# Patient Record
Sex: FEMALE
[De-identification: names, ages, dates, MRNs, and addresses within clinical notes are randomized per-mention and may not be internally consistent; named-entity substitution may affect disease eponyms.]

---

## 2020-08-02 ENCOUNTER — HOSPITAL ENCOUNTER (EMERGENCY)
Dept: HOSPITAL 97 - ER | Age: 50
LOS: 1 days | Discharge: HOME | End: 2020-08-03
Payer: SELF-PAY

## 2020-08-02 DIAGNOSIS — K29.70: Primary | ICD-10-CM

## 2020-08-02 LAB
ALBUMIN SERPL BCP-MCNC: 3.9 G/DL (ref 3.4–5)
ALP SERPL-CCNC: 71 U/L (ref 45–117)
ALT SERPL W P-5'-P-CCNC: 123 U/L (ref 12–78)
AST SERPL W P-5'-P-CCNC: 220 U/L (ref 15–37)
BUN BLD-MCNC: 16 MG/DL (ref 7–18)
GLUCOSE SERPLBLD-MCNC: 129 MG/DL (ref 74–106)
HCT VFR BLD CALC: 39.3 % (ref 36–45)
LIPASE SERPL-CCNC: 137 U/L (ref 73–393)
LYMPHOCYTES # SPEC AUTO: 0.9 K/UL (ref 0.7–4.9)
MORPHOLOGY BLD-IMP: (no result)
PMV BLD: 8.6 FL (ref 7.6–11.3)
POTASSIUM SERPL-SCNC: 4 MMOL/L (ref 3.5–5.1)
RBC # BLD: 4.26 M/UL (ref 3.86–4.86)

## 2020-08-02 PROCEDURE — 83690 ASSAY OF LIPASE: CPT

## 2020-08-02 PROCEDURE — 80048 BASIC METABOLIC PNL TOTAL CA: CPT

## 2020-08-02 PROCEDURE — 99284 EMERGENCY DEPT VISIT MOD MDM: CPT

## 2020-08-02 PROCEDURE — 81003 URINALYSIS AUTO W/O SCOPE: CPT

## 2020-08-02 PROCEDURE — 85025 COMPLETE CBC W/AUTO DIFF WBC: CPT

## 2020-08-02 PROCEDURE — 80076 HEPATIC FUNCTION PANEL: CPT

## 2020-08-02 PROCEDURE — 36415 COLL VENOUS BLD VENIPUNCTURE: CPT

## 2020-08-03 VITALS — TEMPERATURE: 98 F | OXYGEN SATURATION: 99 % | SYSTOLIC BLOOD PRESSURE: 116 MMHG | DIASTOLIC BLOOD PRESSURE: 74 MMHG

## 2020-08-03 NOTE — EDPHYS
Physician Documentation                                                                           

 [facility]                                                                 

Name: Isabelle Walden                                                                    

Age: 50 yrs                                                                                       

Sex: Female                                                                                       

: 1970                                                                                   

MRN: [id number]                                                                                   

Arrival Date: 2020                                                                          

Time: 20:14                                                                                       

Account#: [id number]                                                                            

Bed 8                                                                                             

Private MD:                                                                                       

ED Physician Pedro Landa                                                                     

HPI:                                                                                              

                                                                                             

00:17 This 50 yrs old  Female presents to ER via Ambulatory with complaints of        tw4 

      Abdominal Pain.                                                                             

00:17 The patient presents with abdominal pain in the epigastric area. Onset: The             tw4 

      symptoms/episode began/occurred today. The symptoms do not radiate. Associated signs        

      and symptoms: none. The symptoms are described as sharp. Modifying factors: The             

      symptoms are alleviated by nothing, the symptoms are aggravated by nothing. Severity of     

      pain: At its worst the pain was mild in the emergency department the pain is unchanged.     

      The patient has not experienced similar symptoms in the past.                               

                                                                                                  

OB/GYN:                                                                                           

                                                                                             

22:30 LMP N/A - Unknown                                                                       wh  

                                                                                                  

Historical:                                                                                       

- Allergies:                                                                                      

20:22 No Known Allergies;                                                                     ll1 

- PMHx:                                                                                           

20:22 None;                                                                                   ll1 

- PSHx:                                                                                           

20:22 ;                                                                              ll1 

                                                                                                  

- Immunization history:: Flu vaccine is not up to date.                                           

- Social history:: Smoking status: Patient denies any tobacco usage or history of.                

  Patient/guardian denies using alcohol, street drugs, tobacco products.                          

                                                                                                  

                                                                                                  

ROS:                                                                                              

                                                                                             

00:17 Constitutional: Negative for fever, chills, and weight loss, Eyes: Negative for injury, tw4 

      pain, redness, and discharge, Cardiovascular: Negative for chest pain, palpitations,        

      and edema, Respiratory: Negative for shortness of breath, cough, wheezing, and              

      pleuritic chest pain, Back: Negative for injury and pain, MS/Extremity: Negative for        

      injury and deformity, Skin: Negative for injury, rash, and discoloration, Neuro:            

      Negative for headache, weakness, numbness, tingling, and seizure.                           

      Abdomen/GI: Positive for abdominal pain, Negative for nausea and vomiting, nausea,          

      vomiting, and diarrhea, nausea, diarrhea, constipation, abdominal cramps, abdominal         

      distension, anorexia.                                                                       

                                                                                                  

Exam:                                                                                             

00:17 Constitutional:  This is a well developed, well nourished patient who is awake, alert,  tw4 

      and in no acute distress. Head/Face:  Normocephalic, atraumatic. Chest/axilla:  Normal      

      chest wall appearance and motion.  Nontender with no deformity.  No lesions are             

      appreciated. Cardiovascular:  Regular rate and rhythm with a normal S1 and S2.  No          

      gallops, murmurs, or rubs.  Normal PMI, no JVD.  No pulse deficits. Respiratory:  Lungs     

      have equal breath sounds bilaterally, clear to auscultation and percussion.  No rales,      

      rhonchi or wheezes noted.  No increased work of breathing, no retractions or nasal          

      flaring. Skin:  Warm, dry with normal turgor.  Normal color with no rashes, no lesions,     

      and no evidence of cellulitis. MS/ Extremity:  Pulses equal, no cyanosis.                   

      Neurovascular intact.  Full, normal range of motion. Neuro:  Awake and alert, GCS 15,       

      oriented to person, place, time, and situation.  Cranial nerves II-XII grossly intact.      

      Motor strength 5/5 in all extremities.  Sensory grossly intact.  Cerebellar exam            

      normal.  Normal gait.                                                                       

00:17 Back:  No spinal tenderness.  No costovertebral tenderness.  Full range of motion.          

00:17 Abdomen/GI: Inspection: abdomen appears normal, Bowel sounds: diminished, Palpation:        

      mild abdominal tenderness, in the epigastric area.                                          

                                                                                                  

Vital Signs:                                                                                      

                                                                                             

20:20  / 74; Pulse 67; Resp 17; Temp 97.9; Pulse Ox 98% ; Weight 81.65 kg; Pain 10/10;  ll1 

22:00  / 65; Pulse 92; Resp 17; Pulse Ox 98% on R/A;                                    rv  

23:00  / 51; Pulse 81; Resp 16; Pulse Ox 96% on R/A;                                    rv  

                                                                                             

00:00  / 74; Pulse 84; Resp 18; Temp 98; Pulse Ox 99% on R/A;                           rv  

                                                                                                  

MDM:                                                                                              

                                                                                             

22:14 Patient medically screened.                                                             tw4 

                                                                                             

00:17 Data reviewed: vital signs, nurses notes. Data interpreted: Pulse oximetry:             tw4 

      Interpretation: normal. Counseling: I had a detailed discussion with the patient and/or     

      guardian regarding: the historical points, exam findings, and any diagnostic results        

      supporting the discharge/admit diagnosis. Special discussion: I discussed with the          

      patient/guardian in detail that at this point there is no indication for admission to       

      the hospital. It is understood, however, that if the symptoms persist or worsen the         

      patient needs to return immediately for re-evaluation.                                      

                                                                                                  

                                                                                             

22:16 Order name: Basic Metabolic Panel; Complete Time: 23:31                                 tw4 

                                                                                             

23:31 Interpretation: Normal except: GLUC 129; GFR 60.                                        tw4 

                                                                                             

22:16 Order name: CBC with Diff                                                               tw4 

                                                                                             

23:31 Interpretation: Normal except: RICHARD% 86.6; LYM% 8.8; NEUT A 8.9.                         tw4 

                                                                                             

22:16 Order name: Hepatic Function; Complete Time: 23:31                                      tw4 

                                                                                             

23:31 Interpretation: Normal except: ; ; BILID 0.3; GLOB 3.6.                   tw4 

                                                                                             

22:16 Order name: Lipase; Complete Time: 23:31                                                tw4 

                                                                                             

22:47 Order name: Manual Differential                                                         EDMS

                                                                                             

23:17 Order name: Urine Dipstick--Ancillary (enter results)                                   tt3 

                                                                                             

22:16 Order name: IV Saline Lock; Complete Time: 22:27                                        tw4 

                                                                                             

22:16 Order name: Labs collected and sent; Complete Time: 22:27                               tw4 

                                                                                             

23:30 Order name: Urine Dipstick-Ancillary (obtain specimen); Complete Time: 23:32            tw4 

                                                                                                  

Administered Medications:                                                                         

                                                                                             

23:59 Drug: GI Cocktail without Donnatal - (Maalox Suspension 30 ml, Lidocaine Liquid 2 % 15  wh  

      ml) Route: PO;                                                                              

                                                                                             

00:33 Follow up: Response: No adverse reaction; Pain is decreased                             wh  

                                                                                                  

                                                                                                  

Disposition:                                                                                      

20 00:10 Discharged to Home. Impression: Gastritis, unspecified, Gastritis, unspecified,    

  without bleeding.                                                                               

- Condition is Stable.                                                                            

- Discharge Instructions: Gastritis, Adult.                                                       

- Prescriptions for Carafate 1 gram Oral Tablet - take 1 tablet by ORAL route 4 times             

  per day take on an empty stomach, beginning on waking and last dose at bedtime; 100             

  tablet. Pepcid 20 mg Oral Tablet - take 1 tablet by ORAL route every 12 hours for 10            

  days; 20 tablet.                                                                                

- Medication Reconciliation Form, Thank You Letter, Antibiotic Education, Prescription            

  Opioid Use form.                                                                                

- Follow up: Private Physician; When: Upon discharge from the Emergency Department;               

  Reason: Recheck today's complaints, Continuance of care, Re-evaluation by your                  

  physician.                                                                                      

- Problem is new.                                                                                 

- Symptoms have improved.                                                                         

                                                                                                  

                                                                                                  

                                                                                                  

Signatures:                                                                                       

Dispatcher [employer]                           EDJazlyn Carbone                                                                                   

Pedro Landa MD MD   tw4                                                  

Rosie Villegas RN                       RN   ll1                                                  

                                                                                                  

Corrections: (The following items were deleted from the chart)                                    

00:33 00:10 2020 00:10 Discharged to Home. Impression: Gastritis, unspecified;          wh  

      Gastritis, unspecified, without bleeding. Condition is Stable. Forms are Medication         

      Reconciliation Form, Thank You Letter, Antibiotic Education, Prescription Opioid Use.       

      Follow up: Private Physician; When: Upon discharge from the Emergency Department;           

      Reason: Recheck today's complaints, Continuance of care, Re-evaluation by your              

      physician. Problem is new. Symptoms have improved. tw4                                      

                                                                                                  

**************************************************************************************************

## 2020-08-03 NOTE — ER
Nurse's Notes                                                                                     

 [facility]                                                                 

Name: Isabelle Walden                                                                    

Age: 50 yrs                                                                                       

Sex: Female                                                                                       

: 1970                                                                                   

MRN: [id number]                                                                                   

Arrival Date: 2020                                                                          

Time: 20:14                                                                                       

Account#: [id number]                                                                            

Bed 8                                                                                             

Private MD:                                                                                       

Diagnosis: Gastritis, unspecified;Gastritis, unspecified, without bleeding                        

                                                                                                  

Presentation:                                                                                     

                                                                                             

20:20 Chief complaint: Patient states: Upper abdominal pain for 1 hour PTA. No N/V/D. No      ll1 

      fever. States he had slight pain four days ago, that resolved the same day. Coronavirus     

      screen: Client denies travel out of the [place] in the last 14 days. At this time, the         

      client does not indicate any symptoms associated with coronavirus-19. Ebola Screen:         

      Patient denies travel to an Ebola-affected area in the 21 days before illness onset.        

      Initial Sepsis Screen: Does the patient meet any 2 criteria? No. Patient's initial          

      sepsis screen is negative. Risk Assessment: Do you want to hurt yourself or someone         

      else? Patient reports no desire to harm self or others. Onset of symptoms was 2020.                                                                                   

20:20 Method Of Arrival: Ambulatory                                                           ll1 

20:20 Acuity: AMIRA 3                                                                           ll1 

22:30 Initial Sepsis Screen: Does the patient have a suspected source of infection? No.       wh  

      Patient's initial sepsis screen is negative.                                                

                                                                                                  

OB/GYN:                                                                                           

22:30 LMP N/A - Unknown                                                                         

                                                                                                  

Historical:                                                                                       

- Allergies:                                                                                      

20:22 No Known Allergies;                                                                     ll1 

- PMHx:                                                                                           

20:22 None;                                                                                   ll1 

- PSHx:                                                                                           

20:22 ;                                                                              ll1 

                                                                                                  

- Immunization history:: Flu vaccine is not up to date.                                           

- Social history:: Smoking status: Patient denies any tobacco usage or history of.                

  Patient/guardian denies using alcohol, street drugs, tobacco products.                          

                                                                                                  

                                                                                                  

Screenin:30 Abuse screen: Denies threats or abuse. Denies injuries from another. Nutritional          

      screening: No deficits noted. Tuberculosis screening: No symptoms or risk factors           

      identified. Fall Risk None identified.                                                      

                                                                                                  

Assessment:                                                                                       

22:30 General: Appears in no apparent distress. uncomfortable, Behavior is calm, cooperative, wh  

      appropriate for age. Pain: Complains of pain in epigastric area Pain does not radiate.      

      Pain currently is 8 out of 10 on a pain scale. Quality of pain is described as burning.     

      Neuro: Level of Consciousness is awake, alert, obeys commands, Oriented to person,          

      place, time, situation, Appropriate for age. Cardiovascular: Heart tones S1 S2.             

      Respiratory: Airway is patent Respiratory effort is even, unlabored, Respiratory            

      pattern is regular, symmetrical, Breath sounds are clear bilaterally. GI: Abdomen is        

      flat, non-distended, Bowel sounds present X 4 quads. Abd is soft and non tender Reports     

      upper abdominal pain. : No signs and/or symptoms were reported regarding the              

      genitourinary system. EENT: No signs and/or symptoms were reported regarding the EENT       

      system. Derm: Skin is intact, is healthy with good turgor, Skin is pink, warm \T\ dry.      

      normal. Musculoskeletal: Circulation, motion, and sensation intact.                         

23:30 Reassessment: Patient appears in no apparent distress at this time. No changes from       

      previously documented assessment. Patient and/or family updated on plan of care and         

      expected duration. Pain level reassessed. Patient is alert, oriented x 3, equal             

      unlabored respirations, skin warm/dry/pink.                                                 

                                                                                             

00:15 Reassessment: Patient appears in no apparent distress at this time. No changes from       

      previously documented assessment. Patient and/or family updated on plan of care and         

      expected duration. Pain level reassessed. Patient is alert, oriented x 3, equal             

      unlabored respirations, skin warm/dry/pink. Patient states feeling better. Patient          

      states symptoms have improved.                                                              

                                                                                                  

Vital Signs:                                                                                      

                                                                                             

20:20  / 74; Pulse 67; Resp 17; Temp 97.9; Pulse Ox 98% ; Weight 81.65 kg; Pain 10/10;  ll1 

22:00  / 65; Pulse 92; Resp 17; Pulse Ox 98% on R/A;                                    rv  

23:00  / 51; Pulse 81; Resp 16; Pulse Ox 96% on R/A;                                    rv  

                                                                                             

00:00  / 74; Pulse 84; Resp 18; Temp 98; Pulse Ox 99% on R/A;                           rv  

                                                                                                  

ED Course:                                                                                        

                                                                                             

20:14 Patient arrived in ED.                                                                  cl3 

20:21 Triage completed.                                                                       ll1 

20:22 Arm band placed on Patient notified of wait time.                                       ll1 

22:01 Jazlyn Deluca is Primary Nurse.                                                         wh  

22:14 Pedro Landa MD is Attending Physician.                                            tw4 

22:28 Inserted saline lock: 20 gauge in right antecubital area, using aseptic technique.      rr5 

      Blood collected.                                                                            

22:30 Patient has correct armband on for positive identification. Bed in low position. Call     

      light in reach. Side rails up X 1. Pulse ox on. NIBP on.                                    

                                                                                             

00:32 No provider procedures requiring assistance completed. IV discontinued, intact,           

      bleeding controlled, No redness/swelling at site.                                           

                                                                                                  

Administered Medications:                                                                         

                                                                                             

23:59 Drug: GI Cocktail without Donnatal - (Maalox Suspension 30 ml, Lidocaine Liquid 2 % 15  wh  

      ml) Route: PO;                                                                              

                                                                                             

00:33 Follow up: Response: No adverse reaction; Pain is decreased                               

                                                                                                  

                                                                                                  

Outcome:                                                                                          

00:10 Discharge ordered by MD.                                                                tw4 

00:33 Discharged to home ambulatory.                                                            

00:33 Condition: stable                                                                           

00:33 Discharge instructions given to patient, Instructed on discharge instructions, follow       

      up and referral plans. medication usage, POC Demonstrated understanding of                  

      instructions, follow-up care, medications, POC Prescriptions given X 2.                     

00:33 Patient left the ED.                                                                      

                                                                                                  

Signatures:                                                                                       

Jazlyn Deluca                                                                                   

Pedro Landa MD MD   tw4                                                  

Anson Key, RN                    RN   Liu Tang RN                      RN   rr5                                                  

Joel Villegas                                cl3                                                  

Rosie Villegas RN                       RN   ll1                                                  

                                                                                                  

**************************************************************************************************